# Patient Record
Sex: MALE | Race: WHITE | NOT HISPANIC OR LATINO
[De-identification: names, ages, dates, MRNs, and addresses within clinical notes are randomized per-mention and may not be internally consistent; named-entity substitution may affect disease eponyms.]

---

## 2020-02-10 PROBLEM — Z00.00 ENCOUNTER FOR PREVENTIVE HEALTH EXAMINATION: Status: ACTIVE | Noted: 2020-02-10

## 2020-02-20 ENCOUNTER — NON-APPOINTMENT (OUTPATIENT)
Age: 80
End: 2020-02-20

## 2020-02-20 ENCOUNTER — APPOINTMENT (OUTPATIENT)
Dept: CARDIOLOGY | Facility: CLINIC | Age: 80
End: 2020-02-20
Payer: MEDICARE

## 2020-02-20 VITALS
HEART RATE: 68 BPM | SYSTOLIC BLOOD PRESSURE: 128 MMHG | WEIGHT: 203 LBS | OXYGEN SATURATION: 96 % | DIASTOLIC BLOOD PRESSURE: 68 MMHG | BODY MASS INDEX: 27.8 KG/M2 | HEIGHT: 71.5 IN

## 2020-02-20 VITALS — DIASTOLIC BLOOD PRESSURE: 70 MMHG | SYSTOLIC BLOOD PRESSURE: 128 MMHG

## 2020-02-20 DIAGNOSIS — H54.40 BLINDNESS, ONE EYE, UNSPECIFIED EYE: ICD-10-CM

## 2020-02-20 DIAGNOSIS — I10 ESSENTIAL (PRIMARY) HYPERTENSION: ICD-10-CM

## 2020-02-20 DIAGNOSIS — E78.5 HYPERLIPIDEMIA, UNSPECIFIED: ICD-10-CM

## 2020-02-20 DIAGNOSIS — Z72.3 LACK OF PHYSICAL EXERCISE: ICD-10-CM

## 2020-02-20 DIAGNOSIS — Z78.9 OTHER SPECIFIED HEALTH STATUS: ICD-10-CM

## 2020-02-20 DIAGNOSIS — H91.90 UNSPECIFIED HEARING LOSS, UNSPECIFIED EAR: ICD-10-CM

## 2020-02-20 DIAGNOSIS — R94.31 ABNORMAL ELECTROCARDIOGRAM [ECG] [EKG]: ICD-10-CM

## 2020-02-20 DIAGNOSIS — R06.09 OTHER FORMS OF DYSPNEA: ICD-10-CM

## 2020-02-20 DIAGNOSIS — I63.9 CEREBRAL INFARCTION, UNSPECIFIED: ICD-10-CM

## 2020-02-20 DIAGNOSIS — Z82.3 FAMILY HISTORY OF STROKE: ICD-10-CM

## 2020-02-20 DIAGNOSIS — G62.9 POLYNEUROPATHY, UNSPECIFIED: ICD-10-CM

## 2020-02-20 DIAGNOSIS — Z87.891 PERSONAL HISTORY OF NICOTINE DEPENDENCE: ICD-10-CM

## 2020-02-20 DIAGNOSIS — R06.02 SHORTNESS OF BREATH: ICD-10-CM

## 2020-02-20 PROCEDURE — 93306 TTE W/DOPPLER COMPLETE: CPT

## 2020-02-20 PROCEDURE — 93000 ELECTROCARDIOGRAM COMPLETE: CPT

## 2020-02-20 PROCEDURE — 99204 OFFICE O/P NEW MOD 45 MIN: CPT

## 2020-02-20 RX ORDER — ALBUTEROL SULFATE 90 UG/1
108 (90 BASE) AEROSOL, METERED RESPIRATORY (INHALATION)
Qty: 90 | Refills: 3 | Status: ACTIVE | COMMUNITY
Start: 2020-02-20

## 2020-02-20 RX ORDER — ATENOLOL 100 MG/1
100 TABLET ORAL DAILY
Refills: 0 | Status: ACTIVE | COMMUNITY
Start: 2020-02-20

## 2020-02-20 RX ORDER — RAMIPRIL 5 MG/1
5 CAPSULE ORAL DAILY
Qty: 90 | Refills: 0 | Status: ACTIVE | COMMUNITY
Start: 2020-02-20

## 2020-02-20 RX ORDER — ATORVASTATIN CALCIUM 20 MG/1
20 TABLET, FILM COATED ORAL
Refills: 0 | Status: ACTIVE | COMMUNITY
Start: 2020-02-20

## 2020-02-20 RX ORDER — CLOPIDOGREL 75 MG/1
75 TABLET, FILM COATED ORAL DAILY
Qty: 10 | Refills: 0 | Status: ACTIVE | COMMUNITY
Start: 2020-02-20

## 2020-02-20 NOTE — ASSESSMENT
[FreeTextEntry1] : Reviewed on February 20, 2020\par EKG.  February 20, 2020.  Indication dyspnea.  Essential hypertension.  Interpretation by me.  Normal sinus rhythm.  Poor R wave progression versus possible old anterior infarct\par Most recent labs.  February 11, 2020 stable CBC.  Sodium 138 potassium 4.2 creatinine 1.37.\par Chest x-ray February 11, 2020.  No acute cardiopulmonary process

## 2020-02-20 NOTE — REVIEW OF SYSTEMS
[Chest  Pressure] : no chest pressure [Shortness Of Breath] : shortness of breath [Dyspnea on exertion] : dyspnea during exertion [Chest Pain] : no chest pain [Lower Ext Edema] : no extremity edema [Palpitations] : no palpitations [see HPI] : see HPI [Leg Claudication] : no intermittent leg claudication [Joint Pain] : joint pain [Joint Stiffness] : joint stiffness [Negative] : Endocrine

## 2020-02-20 NOTE — DISCUSSION/SUMMARY
[FreeTextEntry1] : 79-year-old gentleman with above medical history and active medical problems as noted below\par 1.  New exertional dyspnea.  Nonspecific abnormal EKG.  Coronary artery disease risk factor in the form of his age, essential hypertension, hyperlipidemia and history of cerebrovascular accident.\par Unable to do treadmill exercise stress test for further evaluation\par No significant signs of volume overload/pulmonary congestion.\par Former smoker.  With normal chest x-ray.\par Recommendation at present includes\par Echocardiogram for LV ejection fraction wall motion valvular evaluation and pulmonary artery systolic pressure evaluation\par Nuclear myocardial perfusion scan pharmacological on medication to assess evaluate and rule out any significant perfusion abnormality to suggest significant obstructive coronary artery disease.\par Based on above test will discuss further whether he would benefit from further evaluation which may include invasive coronary angiography or further pulmonary testing like pulmonary function test, CT scan of the chest etc.\par BNP level to be checked also with next labs\par #2 essential hypertension.  No signs of congestive heart failure.  Renal insufficiency noted.  Unclear whether he has proteinuria or not.  Stable control at present.  Former smoker.\par Continue ramipril.\par Goal less than 130/80.\par Check for proteinuria if not done.\par #3 hyperlipidemia.  On statin therapy.  Unclear dosage.  Follow-up with your office.  In presence of history of cerebrovascular accident moderate to intensified statin therapy is recommended in the form of Lipitor at 40 mg or 80 mg dosage.\par 4.  History of cerebrovascular accident.  Unclear data.  Has seen neurologist.  Will obtain information which includes MRA of extra and intracerebral vessels.\par \par Counseling regarding low saturated fat, salt and carbohydrate intake was reviewed. Active lifestyle and regular. Exercise along with weight management is advised.\par All the above were at length reviewed. Answered all the questions. Thank you very much for this kind referral. Please do not hesitate to give me a call for any question.\par Part of this transcription was done with voice recognition software and phonetically similar errors are common. I apologize for that. Please do not hesitate to call for any questions due to above.\par \par Follow-up after about test\par \par

## 2020-02-20 NOTE — HISTORY OF PRESENT ILLNESS
[FreeTextEntry1] : Medical history includes\par 1.  Essential hypertension.  Well-controlled according to him.  No CHF.  CKD.  Former smoker.\par 2.  Hyperlipidemia.  Started on atorvastatin unclear dosage\par 3.  History of CVA.  Evaluation management by neurologist.  I do not have records available\par 4.  Overweight state\par 5.  Peripheral neuropathy.  Evaluation in the past without any etiology.

## 2020-02-20 NOTE — PHYSICAL EXAM
[General Appearance - Well Developed] : well developed [Normal Appearance] : normal appearance [General Appearance - Well Nourished] : well nourished [Well Groomed] : well groomed [No Deformities] : no deformities [Normal Oral Mucosa] : normal oral mucosa [General Appearance - In No Acute Distress] : no acute distress [No Oral Pallor] : no oral pallor [No Oral Cyanosis] : no oral cyanosis [Normal Jugular Venous A Waves Present] : normal jugular venous A waves present [Normal Jugular Venous V Waves Present] : normal jugular venous V waves present [No Jugular Venous Rogers A Waves] : no jugular venous rogers A waves [No Precordial Heave] : no precordial heave was noted [Normal] : normal [Normal Rate] : normal [Rhythm Regular] : regular [Normal S2] : normal S2 [Normal S1] : normal S1 [No Gallop] : no gallop heard [S4] : no S4 [S3] : no S3 [I] : a grade 1 [Right Carotid Bruit] : no bruit heard over the right carotid [Left Carotid Bruit] : no bruit heard over the left carotid [Right Femoral Bruit] : no bruit heard over the right femoral artery [Left Femoral Bruit] : no bruit heard over the left femoral artery [No Abnormalities] : the abdominal aorta was not enlarged and no bruit was heard [2+] : left 2+ [No Pitting Edema] : no pitting edema present [Respiration, Rhythm And Depth] : normal respiratory rhythm and effort [Exaggerated Use Of Accessory Muscles For Inspiration] : no accessory muscle use [Abdomen Soft] : soft [Abdomen Tenderness] : non-tender [Auscultation Breath Sounds / Voice Sounds] : lungs were clear to auscultation bilaterally [Abnormal Walk] : normal gait [Abdomen Mass (___ Cm)] : no abdominal mass palpated [Nail Clubbing] : no clubbing of the fingernails [Cyanosis, Localized] : no localized cyanosis [Gait - Sufficient For Exercise Testing] : the gait was sufficient for exercise testing [Petechial Hemorrhages (___cm)] : no petechial hemorrhages [No Venous Stasis] : no venous stasis [] : no rash [Skin Color & Pigmentation] : normal skin color and pigmentation [No Skin Ulcers] : no skin ulcer [Skin Lesions] : no skin lesions [No Xanthoma] : no  xanthoma was observed [Affect] : the affect was normal [Oriented To Time, Place, And Person] : oriented to person, place, and time [Mood] : the mood was normal [No Anxiety] : not feeling anxious

## 2020-02-20 NOTE — REASON FOR VISIT
[Consultation] : a consultation regarding [Dyspnea] : dyspnea [Hyperlipidemia] : hyperlipidemia [Hypertension] : hypertension [FreeTextEntry1] : 79-year-old is seen in the office referred for consultation in presence of recent worsening complaint of exertional dyspnea with minimal activity and exercise.  There is no associated chest pain palpitation diaphoresis arm pain jaw pain.  He has no cough fever or chills.  He has no significant PND orthopnea or pedal edema.  He denies any recent changes in medication except starting on statin therapy.  His weight is stable.  He has stable sleep pattern.  There is no increase in salt or alcohol intake.\par There is no claudication pain though his activity level is limited\par No recent hospital admission\par Extensive recent evaluation from neurological point of view with MRI MRA\par

## 2020-03-11 ENCOUNTER — APPOINTMENT (OUTPATIENT)
Dept: CARDIOLOGY | Facility: CLINIC | Age: 80
End: 2020-03-11

## 2022-10-12 ENCOUNTER — OFFICE (OUTPATIENT)
Dept: URBAN - METROPOLITAN AREA CLINIC 38 | Facility: CLINIC | Age: 82
Setting detail: OPHTHALMOLOGY
End: 2022-10-12
Payer: MEDICARE

## 2022-10-12 ENCOUNTER — RX ONLY (RX ONLY)
Age: 82
End: 2022-10-12

## 2022-10-12 DIAGNOSIS — H35.3134: ICD-10-CM

## 2022-10-12 DIAGNOSIS — H43.813: ICD-10-CM

## 2022-10-12 DIAGNOSIS — H35.373: ICD-10-CM

## 2022-10-12 DIAGNOSIS — H25.13: ICD-10-CM

## 2022-10-12 PROCEDURE — 92134 CPTRZ OPH DX IMG PST SGM RTA: CPT | Performed by: OPHTHALMOLOGY

## 2022-10-12 PROCEDURE — 92004 COMPRE OPH EXAM NEW PT 1/>: CPT | Performed by: OPHTHALMOLOGY

## 2022-10-12 ASSESSMENT — CONFRONTATIONAL VISUAL FIELD TEST (CVF)
OS_FINDINGS: FULL
OD_FINDINGS: FULL

## 2022-10-12 ASSESSMENT — VISUAL ACUITY
OD_BCVA: 20/500
OS_BCVA: 20/300